# Patient Record
Sex: MALE | Race: WHITE
[De-identification: names, ages, dates, MRNs, and addresses within clinical notes are randomized per-mention and may not be internally consistent; named-entity substitution may affect disease eponyms.]

---

## 2020-08-11 ENCOUNTER — HOSPITAL ENCOUNTER (EMERGENCY)
Dept: HOSPITAL 47 - EC | Age: 30
Discharge: HOME | End: 2020-08-11
Payer: COMMERCIAL

## 2020-08-11 VITALS
SYSTOLIC BLOOD PRESSURE: 146 MMHG | HEART RATE: 96 BPM | DIASTOLIC BLOOD PRESSURE: 101 MMHG | RESPIRATION RATE: 18 BRPM | TEMPERATURE: 98.5 F
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
## 2020-08-11 DIAGNOSIS — S02.5XXA: ICD-10-CM

## 2020-08-11 DIAGNOSIS — K02.9: Primary | ICD-10-CM

## 2020-08-11 DIAGNOSIS — F17.200: ICD-10-CM

## 2020-08-11 PROCEDURE — 99283 EMERGENCY DEPT VISIT LOW MDM: CPT

## 2020-08-11 NOTE — ED
ENT HPI





- General


Source: patient, RN notes reviewed, old records reviewed


Mode of arrival: ambulatory


Limitations: no limitations





<Evita Pineda - Last Filed: 08/11/20 11:04>





<Sugar Douglass - Last Filed: 08/13/20 01:32>





- General


Chief complaint: Dental/Oral


Stated complaint: dental pain


Time Seen by Provider: 08/11/20 10:54





- History of Present Illness


Initial comments: 





Patient is a 30-year-old male who presents emergency department today for 

complaint of right lower dental pain for the past 4 days.  Has multiple dental 

caries.  Patient states that he has an upcoming foot with a dentist within the 

next 2 weeks.  Patient states that he has been taking Motrin and doing mouth 

rinses for pain relief.  Patient states that he's had no fevers or chills.  

Denies trismus.  Denies any palpable mass over the jaw or teeth. (Evita Pineda)





- Related Data


                                  Previous Rx's











 Medication  Instructions  Recorded


 


HYDROcodone/APAP 5-325MG [Norco 5] 1 each PO Q4HR PRN #20 tab 10/18/15


 


Naproxen [Naprosyn] 500 mg PO Q12HR #24 tab 10/18/15


 


Penicillin V Potassium [Pen Vee K] 500 mg PO QID #40 tab 10/18/15


 


Amoxic-Pot Clav 875-125Mg 1 tab PO Q12HR #20 tablet 08/11/20





[Augmentin 875-125]  


 


Ibuprofen [Motrin] 800 mg PO Q8H #20 tab 08/11/20











                                    Allergies











Allergy/AdvReac Type Severity Reaction Status Date / Time


 


No Known Allergies Allergy   Verified 08/11/20 10:50














Review of Systems


ROS Other: All systems not noted in ROS Statement are negative.





<Evita Pineda - Last Filed: 08/11/20 11:04>


ROS Other: All systems not noted in ROS Statement are negative.





<Sugar Douglass - Last Filed: 08/13/20 01:32>


ROS Statement: 


Those systems with pertinent positive or pertinent negative responses have been 

documented in the HPI.








Past Medical History


Past Medical History: No Reported History


Additional Past Medical History / Comment(s): seasonal allergies


History of Any Multi-Drug Resistant Organisms: None Reported


Past Surgical History: No Surgical Hx Reported


Past Psychological History: No Psychological Hx Reported


Smoking Status: Current every day smoker


Past Alcohol Use History: None Reported


Past Drug Use History: Marijuana





<JohanaEvita pratt - Last Filed: 08/11/20 11:04>





General Exam


Limitations: no limitations


General appearance: alert, in no apparent distress


Head exam: Present: atraumatic, normocephalic, normal inspection


Eye exam: Present: normal appearance, PERRL, EOMI.  Absent: scleral icterus, 

conjunctival injection, periorbital swelling


ENT exam: Present: normal exam, mucous membranes moist, other (Patient has poor 

dentition and evidence of multiple dental caries in the right lower jaw broken 

teeth.)


Neck exam: Present: normal inspection.  Absent: tenderness, meningismus, 

lymphadenopathy


Respiratory exam: Present: normal lung sounds bilaterally.  Absent: respiratory 

distress, wheezes, rales, rhonchi, stridor


Cardiovascular Exam: Present: regular rate, normal rhythm, normal heart sounds. 

 Absent: systolic murmur, diastolic murmur, rubs, gallop, clicks





<TiaangelLauraEvita - Last Filed: 08/11/20 11:04>





- General Exam Comments


Initial Comments: 





30-year-old male.  Oriented.  No distress (Evita Pineda)





Course





                                   Vital Signs











  08/11/20





  10:48


 


Temperature 98.5 F


 


Pulse Rate 96


 


Respiratory 18





Rate 


 


Blood Pressure 146/101


 


O2 Sat by Pulse 99





Oximetry 














Medical Decision Making





<Evita Pineda - Last Filed: 08/11/20 11:04>





<Sugar Douglass - Last Filed: 08/13/20 01:32>





- Medical Decision Making





30-year-old male presents return to the right lower jaw dental pain for the past

 4 days.  If this time Patient has multiple dental caries and broken teeth.  No 

palpable abscess or drainable abscess at this time.  Patient will be started on 

antibiotics and given starter pack emergency arm.  Given a referral for dental 

clinic. (Evita Pineda)


I was available for consultation in the emergency department.  The history and 

physical exam were done by the midlevel provider. I was not consulted for this 

patients care.


Chart was dictated using Dragon dictation software.  Attempts were made to 

correct any dictation errors however some typographical errors may persist. 


Patient was seen during a national state of emergency due to the Covid-19 

pandemic. 


 (Sugar Douglass)





Disposition


Is patient prescribed a controlled substance at d/c from ED?: No


Time of Disposition: 11:08





<Evita Pineda - Last Filed: 08/11/20 11:04>





<Sugar Douglass - Last Filed: 08/13/20 01:32>


Clinical Impression: 


 Pain, dental





Disposition: HOME SELF-CARE


Instructions (If sedation given, give patient instructions):  Dental Abscess 

(ED), Toothache (ED)


Additional Instructions: 





[facility]





[address] [place] [address]





[phone]  [phone] (existing clients only)





For new clients: [phone]





1st consult: $50 (includes Xrays)





Usually 30% less then private dentist for visits after. 





[facility]





Have to pay $50 for Xrays anmd rest is covered.





[phone]


Prescriptions: 


Amoxic-Pot Clav 875-125Mg [Augmentin 875-125] 1 tab PO Q12HR #20 tablet


Ibuprofen [Motrin] 800 mg PO Q8H #20 tab


Referrals: 


None,Stated [Primary Care Provider] - 1-2 days